# Patient Record
Sex: FEMALE | Race: WHITE | Employment: FULL TIME | URBAN - METROPOLITAN AREA
[De-identification: names, ages, dates, MRNs, and addresses within clinical notes are randomized per-mention and may not be internally consistent; named-entity substitution may affect disease eponyms.]

---

## 2022-05-06 ENCOUNTER — PATIENT MESSAGE (OUTPATIENT)
Dept: SURGERY | Age: 72
End: 2022-05-06

## 2022-05-06 NOTE — TELEPHONE ENCOUNTER
Spoke with patient and she said that she is going to see her other surgeon which is closer to her. They have told her that the OR is in \"better shape\" so she is going to try it there. She will call back if anything changes.

## 2022-05-06 NOTE — TELEPHONE ENCOUNTER
From: Sebastián Covarrubias  To: Kit Urban MD  Sent: 5/6/2022 8:36 AM EDT  Subject: Cancellation of appointment     I have been trying to call your office, need to cancel my appointment. Will do the hernia surgery at [facility]. Plans have changed. Thanks!